# Patient Record
Sex: MALE | Race: WHITE | Employment: OTHER | URBAN - METROPOLITAN AREA
[De-identification: names, ages, dates, MRNs, and addresses within clinical notes are randomized per-mention and may not be internally consistent; named-entity substitution may affect disease eponyms.]

---

## 2017-01-18 ENCOUNTER — TELEPHONE (OUTPATIENT)
Dept: FAMILY MEDICINE | Facility: CLINIC | Age: 82
End: 2017-01-18

## 2017-01-18 NOTE — TELEPHONE ENCOUNTER
David has been living with his son for past 5 months and is now moving back home.  He states that he knows that Dr. Leung doesn't care for alzheimer's patients (not sure where that information came from) and he wanted a recommendation from  that he thinks he should see.  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    He states that if he's gotta have it,  this is the best kind to have.  Please call and assess.  Thank you..Rachana Rodarte

## 2017-01-18 NOTE — TELEPHONE ENCOUNTER
Message left on answering machine to call the [facility] RN back.  Ivan Hughes RN

## 2017-01-18 NOTE — TELEPHONE ENCOUNTER
David was happy to hear that.  He will schedule an appointment when he gets back in town.  ..Rachana Rodarte

## 2020-02-08 ENCOUNTER — HEALTH MAINTENANCE LETTER (OUTPATIENT)
Age: 85
End: 2020-02-08